# Patient Record
Sex: MALE | URBAN - METROPOLITAN AREA
[De-identification: names, ages, dates, MRNs, and addresses within clinical notes are randomized per-mention and may not be internally consistent; named-entity substitution may affect disease eponyms.]

---

## 2017-05-04 ENCOUNTER — IMPORTED ENCOUNTER (OUTPATIENT)
Dept: URBAN - METROPOLITAN AREA CLINIC 1 | Facility: CLINIC | Age: 22
End: 2017-05-04

## 2017-05-04 PROBLEM — H52.13: Noted: 2017-05-04

## 2017-05-04 PROCEDURE — S0620 ROUTINE OPHTHALMOLOGICAL EXA: HCPCS

## 2017-05-04 NOTE — PATIENT DISCUSSION
1. Myopia: Rx was given for correction if indicated and requested. 2. Return for an appointment in 1 week for Contact lens check. with Dr. Mary Ontiveros. 3.  Return for an appointment in 1 year for 40 and Contact lens check. with Dr. Mary Ontiveros.

## 2017-05-11 ENCOUNTER — IMPORTED ENCOUNTER (OUTPATIENT)
Dept: URBAN - METROPOLITAN AREA CLINIC 1 | Facility: CLINIC | Age: 22
End: 2017-05-11

## 2017-05-11 NOTE — PATIENT DISCUSSION
1.  CC today - finalized CTL RX for -5.oo Sph OU. Good comfortReturn for an appointment in 1 year 40/cc with Dr. Savita Hudson.

## 2018-08-02 ENCOUNTER — IMPORTED ENCOUNTER (OUTPATIENT)
Dept: URBAN - METROPOLITAN AREA CLINIC 1 | Facility: CLINIC | Age: 23
End: 2018-08-02

## 2018-08-02 PROBLEM — H52.13: Noted: 2018-08-02

## 2018-08-02 PROCEDURE — S0621 ROUTINE OPHTHALMOLOGICAL EXA: HCPCS

## 2018-08-02 NOTE — PATIENT DISCUSSION
1. Myopia OU- Rx for glasses/ CLs given 2. COAG Suspect OU (CD 0.7 OU) Fm Hx? (Possibly Grandparent) Pt should return in 1 month for baseline testing. Return for an appointment in 1 mo 30 OCT/ VF 24-2 OU with Dr. Wilma Mullins. Return for an appointment in 1 yr 40/cc with Dr. Wilma Mullins.

## 2018-09-05 ENCOUNTER — IMPORTED ENCOUNTER (OUTPATIENT)
Dept: URBAN - METROPOLITAN AREA CLINIC 1 | Facility: CLINIC | Age: 23
End: 2018-09-05

## 2018-09-05 PROBLEM — H40.023: Noted: 2018-09-05

## 2018-09-05 PROCEDURE — 92133 CPTRZD OPH DX IMG PST SGM ON: CPT

## 2018-09-05 PROCEDURE — 92083 EXTENDED VISUAL FIELD XM: CPT

## 2018-09-05 PROCEDURE — 92014 COMPRE OPH EXAM EST PT 1/>: CPT

## 2018-09-05 NOTE — PATIENT DISCUSSION
1.  Glaucoma Suspect OU (CD: 0.70 / 0.80) -- IOP stable today at 16 OU. ? Possible Family h/o Glaucoma ? (Possibly Grandparent). 24-2 HVF today shows WNL OU. OCT today shows WNL OD and minimal thinning OS. Patient is considered high risk. Condition was discussed with patient and patient understands. Will continue to monitor patient for any progression in condition. Patient was advised to call us with any problems questions or concerns. Return for an appointment in 1 YR for a 36 / CC OU with Dr. Sarai Love. Return for an appointment in 1 YR for a 27 / OCT OU with Dr. Sarai Love. Patient defers the refraction at today's visit (did under [address] in August 2018).

## 2019-09-20 ENCOUNTER — IMPORTED ENCOUNTER (OUTPATIENT)
Dept: URBAN - METROPOLITAN AREA CLINIC 1 | Facility: CLINIC | Age: 24
End: 2019-09-20

## 2019-09-20 PROBLEM — H40.023: Noted: 2019-09-20

## 2019-09-20 PROCEDURE — 92133 CPTRZD OPH DX IMG PST SGM ON: CPT

## 2019-09-20 PROCEDURE — 92015 DETERMINE REFRACTIVE STATE: CPT

## 2019-09-20 PROCEDURE — 92014 COMPRE OPH EXAM EST PT 1/>: CPT

## 2019-09-20 NOTE — PATIENT DISCUSSION
1.  Glaucoma Suspect OU (CD: 0.75/0.80) ? Possible Family h/o Glaucoma ? (Possibly Grandparent). IOP stable today measuring 17 OU. OCT shows no progression OU. Cont to observe off gtts. Pt considered High Risk. MRx given for glasses & CLs per patient request. Letter to PCP Return for an appointment in 1 yr 27 VF Carla Bazan. Return for an appointment in September 40/cc with Dr. Christiano Bazan.

## 2022-04-02 ASSESSMENT — VISUAL ACUITY
OS_SC: 20/20
OD_SC: 20/20
OS_SC: 20/25-1
OD_SC: 20/25
OD_SC: 20/20-1
OS_SC: 20/20
OS_SC: 20/20
OD_SC: 20/20
OS_SC: 20/20
OD_CC: J1+
OD_SC: 20/20
OS_CC: J1+

## 2022-04-02 ASSESSMENT — KERATOMETRY
OD_K1POWER_DIOPTERS: 42.00
OD_AXISANGLE2_DEGREES: 094
OS_AXISANGLE_DEGREES: 168
OD_AXISANGLE_DEGREES: 004
OS_AXISANGLE2_DEGREES: 078
OD_K2POWER_DIOPTERS: 43.75
OS_K2POWER_DIOPTERS: 43.50
OS_K1POWER_DIOPTERS: 42.25

## 2022-04-02 ASSESSMENT — TONOMETRY
OS_IOP_MMHG: 13
OD_IOP_MMHG: 17
OS_IOP_MMHG: 17
OD_IOP_MMHG: 13
OS_IOP_MMHG: 18
OD_IOP_MMHG: 18
OS_IOP_MMHG: 16
OD_IOP_MMHG: 16